# Patient Record
Sex: MALE | Race: WHITE | Employment: STUDENT | URBAN - METROPOLITAN AREA
[De-identification: names, ages, dates, MRNs, and addresses within clinical notes are randomized per-mention and may not be internally consistent; named-entity substitution may affect disease eponyms.]

---
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
## 2025-07-11 ENCOUNTER — HOSPITAL ENCOUNTER (OUTPATIENT)
Age: 20
Discharge: HOME | End: 2025-07-11
Payer: COMMERCIAL

## 2025-07-11 ENCOUNTER — APPOINTMENT (OUTPATIENT)
Dept: GENERAL RADIOLOGY | Age: 20
End: 2025-07-11
Attending: PHYSICIAN ASSISTANT
Payer: COMMERCIAL

## 2025-07-11 VITALS
DIASTOLIC BLOOD PRESSURE: 79 MMHG | OXYGEN SATURATION: 100 % | RESPIRATION RATE: 16 BRPM | TEMPERATURE: 98 F | HEART RATE: 77 BPM | SYSTOLIC BLOOD PRESSURE: 124 MMHG

## 2025-07-11 DIAGNOSIS — S61.011A LACERATION OF RIGHT THUMB WITHOUT FOREIGN BODY WITHOUT DAMAGE TO NAIL, INITIAL ENCOUNTER: Primary | ICD-10-CM

## 2025-07-11 PROCEDURE — 99213 OFFICE O/P EST LOW 20 MIN: CPT

## 2025-07-11 PROCEDURE — 73140 X-RAY EXAM OF FINGER(S): CPT | Performed by: RADIOLOGY

## 2025-07-11 NOTE — ED INITIAL ASSESSMENT (HPI)
Patient arrives ambulatory with c/o a new blade slipping and cutting his right thumb. Reports the laceration is small, however he is concerned a part of the blade chipped off as the blade was new and intact prior to cutting himself and reports it looks as a part of the tip is missing.

## 2025-07-11 NOTE — ED PROVIDER NOTES
Patient Seen in: [facility]        History  Chief Complaint   Patient presents with    Arm or Hand Injury     Stated Complaint: Right Hand Injury    Subjective:   HPI            19-year-old male presents to the immediate care for evaluation of a right thumb laceration.  Patient states he had a new blade an X-Acto knife and accidentally slipped causing a small cut to the right thumb.  He is concerned about possibility of retained blade in the thumb.  Last tetanus was 9 years ago.  Patient is right-hand dominant      Objective:     Past Medical History:    Seasonal allergies              Past Surgical History:   Procedure Laterality Date    Remove tonsils/adenoids,<10 y/o  6/8/2016                Social History     Socioeconomic History    Marital status: Single   Tobacco Use    Smoking status: Never    Smokeless tobacco: Never              Review of Systems    Positive for stated complaint: Right Hand Injury  Other systems are as noted in HPI.  Constitutional and vital signs reviewed.      All other systems reviewed and negative except as noted above.                  Physical Exam    ED Triage Vitals [07/11/25 1232]   /79   Pulse 77   Resp 16   Temp 98.4 °F (36.9 °C)   Temp src Oral   SpO2 100 %   O2 Device None (Room air)       Current Vitals:   Vital Signs  BP: 124/79  Pulse: 77  Resp: 16  Temp: 98.4 °F (36.9 °C)  Temp src: Oral    Oxygen Therapy  SpO2: 100 %  O2 Device: None (Room air)            Physical Exam  Vitals and nursing note reviewed.   Constitutional:       General: He is not in acute distress.  HENT:      Head: Normocephalic and atraumatic.      Right Ear: External ear normal.      Left Ear: External ear normal.      Nose: Nose normal.      Mouth/Throat:      Mouth: Mucous membranes are moist.   Eyes:      Extraocular Movements: Extraocular movements intact.      Pupils: Pupils are equal, round, and reactive to light.   Cardiovascular:      Rate and Rhythm: Normal rate.    Pulmonary:      Effort: Pulmonary effort is normal.   Abdominal:      General: Abdomen is flat.   Musculoskeletal:         General: Normal range of motion.        Hands:       Cervical back: Normal range of motion.      Comments: Small laceration to the medial aspect of right thumb.   Skin:     General: Skin is warm.   Neurological:      General: No focal deficit present.      Mental Status: He is alert and oriented to person, place, and time.   Psychiatric:         Mood and Affect: Mood normal.         Behavior: Behavior normal.                 ED Course  Labs Reviewed - No data to display     19-year-old male presents to the immediate care for evaluation of laceration to the right thumb.  Patient concerned about possibility of retained blade as it appears the blade tip had broken.    Ddx-laceration, retained foreign body, tendon injury    X-ray negative for foreign body.  We discussed wound care, return precautions.                    MDM             Medical Decision Making      Disposition and Plan     Clinical Impression:  1. Laceration of right thumb without foreign body without damage to nail, initial encounter         Disposition:  Discharge  7/11/2025  1:17 pm    Follow-up:  Brayan Osborn MD  [address]  [address]  [facility] [address] [phone]                Medications Prescribed:  Discharge Medication List as of 7/11/2025  1:18 PM                Supplementary Documentation:

## (undated) NOTE — LETTER
February 11, 2018    Patient: Brendan Cleary   Date of Visit: 2/11/2018       To Whom It May Concern:    Gustav Dance was seen and treated in our emergency department on 2/11/2018. He should not participate in gym/sports until 2/19/18.     If you have a

## (undated) NOTE — ED AVS SNAPSHOT
[facility] AND [facility] in [facility] [address] [place] [address] [address]    Phone:  [phone]    Fax:  [phone]           Karen Armas   MRN: [id number]    Department:  [facility] in [place]   Date of Visit:  5/ and physician's office to determine coverage and benefits available for follow-up care and referrals. It is our goal to assure that you are completely satisfied with every aspect of your visit today.   In an effort to constantly improve our service to y Any imaging studies and labs completed today can be reviewed in your MyChart account. You may have had testing done that requires us to contact you. Please make sure we have your correct phone number on file.      OUR CURRENT HOURS OF OPERATION:  MONDAY T and ask to get set up for an insurance coverage that is in-network with Effie Wilson. [employer]     Sign up for [employer] access for your child.   [employer] access allows you to view health information for your child from their recent   visit, vi

## (undated) NOTE — ED AVS SNAPSHOT
Parent/Legal Guardian Access to the Online [employer] Record of a Patient 15to 16Years Old  Return completed form by Secure email to [facility]/Medical Records Department: [email or web address].     Requirements and Procedures   Under [facility] MyChart ID and password with another person, that person may be able to view my or my child’s health information, and health information about someone who has authorized me as a MyChart proxy.    ·  I agree that it is my responsibility to select a confident Sign-Up Form and I agree to its terms.        Authorization Form     Please enter Patient’s information below:   Name (last, first, middle initial) __________________________________________   Gender  Male  Female    Last 4 Digits of Social Security Number Parent/Legal Guardian Signature                                  For Patient (1517 years of age)  I agree to allow my parent/legal guardian, named above, online access to my medical information currently available and that may become available as a result

## (undated) NOTE — LETTER
Date & Time: 1/23/2023, 11:18 AM  Patient: Foster Meléndez  Encounter Provider(s):    LIBBY Mata       To Whom It May Concern:    Sukhdev Merritt was seen and treated in our department on 1/23/2023. He can return to school 01/24/2023. If you have any questions or concerns, please do not hesitate to call.       VIVIENNE Hamilton  _____________________________  MTLKIGGGE/[facility] Signature

## (undated) NOTE — ED AVS SNAPSHOT
Munizishmael Hyde   MRN: [id number]    Department:  [facility] Emergency Department   Date of Visit:  2/11/2018           Disclosure     Insurance plans vary and the physician(s) referred by the ER may not be covered by your plan.  Please contact y CARE PHYSICIAN AT ONCE OR RETURN IMMEDIATELY TO THE EMERGENCY DEPARTMENT. If you have been prescribed any medication(s), please fill your prescription right away and begin taking the medication(s) as directed.   If you believe that any of the medications